# Patient Record
Sex: FEMALE | Race: WHITE | NOT HISPANIC OR LATINO
[De-identification: names, ages, dates, MRNs, and addresses within clinical notes are randomized per-mention and may not be internally consistent; named-entity substitution may affect disease eponyms.]

---

## 2022-09-26 PROBLEM — Z00.00 ENCOUNTER FOR PREVENTIVE HEALTH EXAMINATION: Status: ACTIVE | Noted: 2022-09-26

## 2022-10-18 ENCOUNTER — APPOINTMENT (OUTPATIENT)
Dept: ORTHOPEDIC SURGERY | Facility: CLINIC | Age: 47
End: 2022-10-18

## 2022-10-18 VITALS — WEIGHT: 200 LBS | BODY MASS INDEX: 35.88 KG/M2 | HEIGHT: 62.5 IN

## 2022-10-18 DIAGNOSIS — Z86.2 PERSONAL HISTORY OF DISEASES OF THE BLOOD AND BLOOD-FORMING ORGANS AND CERTAIN DISORDERS INVOLVING THE IMMUNE MECHANISM: ICD-10-CM

## 2022-10-18 DIAGNOSIS — Z78.9 OTHER SPECIFIED HEALTH STATUS: ICD-10-CM

## 2022-10-18 DIAGNOSIS — G25.89 OTHER SPECIFIED EXTRAPYRAMIDAL AND MOVEMENT DISORDERS: ICD-10-CM

## 2022-10-18 DIAGNOSIS — M32.9 SYSTEMIC LUPUS ERYTHEMATOSUS, UNSPECIFIED: ICD-10-CM

## 2022-10-18 DIAGNOSIS — Z87.39 PERSONAL HISTORY OF OTHER DISEASES OF THE MUSCULOSKELETAL SYSTEM AND CONNECTIVE TISSUE: ICD-10-CM

## 2022-10-18 DIAGNOSIS — Z86.79 PERSONAL HISTORY OF OTHER DISEASES OF THE CIRCULATORY SYSTEM: ICD-10-CM

## 2022-10-18 DIAGNOSIS — J45.909 UNSPECIFIED ASTHMA, UNCOMPLICATED: ICD-10-CM

## 2022-10-18 DIAGNOSIS — S46.912A STRAIN OF UNSPECIFIED MUSCLE, FASCIA AND TENDON AT SHOULDER AND UPPER ARM LEVEL, LEFT ARM, INITIAL ENCOUNTER: ICD-10-CM

## 2022-10-18 DIAGNOSIS — Z63.5 DISRUPTION OF FAMILY BY SEPARATION AND DIVORCE: ICD-10-CM

## 2022-10-18 PROCEDURE — 73030 X-RAY EXAM OF SHOULDER: CPT | Mod: LT

## 2022-10-18 PROCEDURE — 99213 OFFICE O/P EST LOW 20 MIN: CPT

## 2022-10-18 RX ORDER — HYDROXYCHLOROQUINE SULFATE 200 MG/1
200 TABLET ORAL
Refills: 0 | Status: ACTIVE | COMMUNITY

## 2022-10-18 RX ORDER — OMEPRAZOLE 20 MG/1
20 CAPSULE, DELAYED RELEASE ORAL
Refills: 0 | Status: ACTIVE | COMMUNITY

## 2022-10-18 RX ORDER — BENRALIZUMAB 30 MG/ML
30 INJECTION, SOLUTION SUBCUTANEOUS
Refills: 0 | Status: ACTIVE | COMMUNITY

## 2022-10-18 RX ORDER — ALBUTEROL SULFATE 2.5 MG/3ML
(2.5 MG/3ML) SOLUTION RESPIRATORY (INHALATION)
Refills: 0 | Status: ACTIVE | COMMUNITY

## 2022-10-18 RX ORDER — MELOXICAM 15 MG/1
TABLET ORAL
Refills: 0 | Status: ACTIVE | COMMUNITY

## 2022-10-18 SDOH — SOCIAL STABILITY - SOCIAL INSECURITY: DISRUPTION OF FAMILY BY SEPARATION AND DIVORCE: Z63.5

## 2022-10-18 NOTE — REASON FOR VISIT
[FreeTextEntry2] : here today for f/u left shoulder pain.  c/o 1 month ago heard a pop in left shoulder.  c/o numbness in hand if she lays wrong in bed. \par using meloxicam and prednisone (breathing prob) which is helping pain. s/p left shouder scope 9/17/20. Patient is taking meloxicam and oral prednisone. She is taking 5-10 mg of prednisone per day

## 2022-10-18 NOTE — DISCUSSION/SUMMARY
[Medication Risks Reviewed] : Medication risks reviewed [de-identified] : 48 y/o female with left shoulder strain. Patient advised to initiate physical therapy for scapula stabilizing muscle massage. Discussed importance of exercise and muscle stretching before and after exercise. Reviewed x-rays. Explained the importance of ice and rest. Continue treatment with Meloxicam and prednisone as prescribed.

## 2022-10-18 NOTE — PHYSICAL EXAM
[5 ___] : forward flexion 5[unfilled]/5 [5___] : internal rotation 5[unfilled]/5 [Left] : left shoulder [] : negative Madsen [FreeTextEntry8] : medial scapula tenderness [de-identified] : with pain [FreeTextEntry1] : Type 1 Acromion; no significant bone abnormalities [TWNoteComboBox7] : active forward flexion 160 degrees [TWNoteComboBox6] : internal rotation L3 [de-identified] : external rotation 80 degrees

## 2023-06-22 ENCOUNTER — EMERGENCY (EMERGENCY)
Facility: HOSPITAL | Age: 48
LOS: 1 days | Discharge: HOME | End: 2023-06-22
Attending: EMERGENCY MEDICINE
Payer: COMMERCIAL

## 2023-06-22 VITALS
HEART RATE: 74 BPM | WEIGHT: 149.91 LBS | TEMPERATURE: 98 F | OXYGEN SATURATION: 99 % | HEIGHT: 64 IN | SYSTOLIC BLOOD PRESSURE: 118 MMHG | DIASTOLIC BLOOD PRESSURE: 77 MMHG | RESPIRATION RATE: 20 BRPM

## 2023-06-22 PROCEDURE — 99284 EMERGENCY DEPT VISIT MOD MDM: CPT

## 2023-06-22 PROCEDURE — 94640 AIRWAY INHALATION TREATMENT: CPT

## 2023-06-22 PROCEDURE — 96372 THER/PROPH/DIAG INJ SC/IM: CPT

## 2023-06-22 PROCEDURE — 99284 EMERGENCY DEPT VISIT MOD MDM: CPT | Mod: 25

## 2023-06-22 RX ORDER — IPRATROPIUM/ALBUTEROL SULFATE 18-103MCG
3 AEROSOL WITH ADAPTER (GRAM) INHALATION ONCE
Refills: 0 | Status: COMPLETED | OUTPATIENT
Start: 2023-06-22 | End: 2023-06-22

## 2023-06-22 RX ORDER — IPRATROPIUM/ALBUTEROL SULFATE 18-103MCG
3 AEROSOL WITH ADAPTER (GRAM) INHALATION
Refills: 0 | Status: DISCONTINUED | OUTPATIENT
Start: 2023-06-22 | End: 2023-06-29

## 2023-06-22 RX ORDER — IPRATROPIUM/ALBUTEROL SULFATE 18-103MCG
2 AEROSOL WITH ADAPTER (GRAM) INHALATION EVERY 6 HOURS
Refills: 0 | Status: DISCONTINUED | OUTPATIENT
Start: 2023-06-22 | End: 2023-06-22

## 2023-06-22 RX ORDER — IBUPROFEN 200 MG
800 TABLET ORAL ONCE
Refills: 0 | Status: DISCONTINUED | OUTPATIENT
Start: 2023-06-22 | End: 2023-06-29

## 2023-06-22 RX ORDER — DEXAMETHASONE 0.5 MG/5ML
9 ELIXIR ORAL ONCE
Refills: 0 | Status: COMPLETED | OUTPATIENT
Start: 2023-06-22 | End: 2023-06-22

## 2023-06-22 RX ADMIN — Medication 3 MILLILITER(S): at 13:00

## 2023-06-22 RX ADMIN — Medication 9 MILLIGRAM(S): at 13:03

## 2023-06-22 RX ADMIN — Medication 3 MILLILITER(S): at 11:02

## 2023-06-22 NOTE — ED PROVIDER NOTE - NS ED ATTENDING STATEMENT MOD
This was a shared visit with the KYLEIGH. I reviewed and verified the documentation and independently performed the documented:

## 2023-06-22 NOTE — ED ADULT NURSE NOTE - OBJECTIVE STATEMENT
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

## 2023-06-22 NOTE — ED PROVIDER NOTE - CLINICAL SUMMARY MEDICAL DECISION MAKING FREE TEXT BOX
48-year-old male female presented to ED complaining of allergic reaction x1 day.  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  Patient admits to wheezing and difficulty breathing at which time she injected herself with an EpiPen.  Patient admits to history of spinal fusion, disc herniation, lupus and aneurysm. HEENT: Normal findings, Eyes : PERRLA, EOMI , Nares clear and Throat : WNL  Lungs: Clear B/L with good air entry  CVS: S1-S2 , with no murmurs  Abd : Normal BS, with no tenderness or organomegaly  Ext: Normal findings  No signs of Allergic reaction . 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

## 2023-06-22 NOTE — ED PROVIDER NOTE - OBJECTIVE STATEMENT
48-year-old male female presented to ED complaining of allergic reaction x1 day.  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  Patient admits to wheezing and difficulty breathing at which time she injected herself with an EpiPen.  Patient admits to history of spinal fusion, disc herniation, lupus and aneurysm.  Patient denies any headache, chest pain, shortness of breath.  Patient admits to allergic asthma after COVID-19 infection.  Patient denies any other issue at this time.

## 2023-06-22 NOTE — ED PROVIDER NOTE - PROGRESS NOTE DETAILS
Patient tolerated DuoNeb treatment x3.  Patient also treated with dexamethasone 10 mg IV.  Patient given Benadryl 50 mg IV.  On reevaluation patient has no wheezing lungs is clear.   Patient came from bathroom and started having difficulty breathing and heart rate went up and stridor from umbilical cord.  Patient symptoms consistent with paradoxical vocal cord spasm.  Patient made aware of her clinical assessment.  Patient stated that she was she was told to follow ENT to give vocal cord training.  Patient states that she will follow-up with ENT as discussed.  Patient DC in stable condition and will follow-up for her ENT Patient placed on bedside monitor heart rate within 78 bpm, blood pressure maintained no arrhythmia noted.  Patient has no chest pain.  Patient monitor for an extended period in ED and DC in stable condition

## 2023-06-22 NOTE — ED PROVIDER NOTE - PATIENT PORTAL LINK FT
You can access the FollowMyHealth Patient Portal offered by [facility] by registering at the following website: [email or web address]. By joining [employer]’s FollowMyHealth portal, you will also be able to view your health information using other applications (apps) compatible with our system.

## 2023-06-22 NOTE — ED PROVIDER NOTE - ATTENDING APP SHARED VISIT CONTRIBUTION OF CARE
I agree with the PA's note and was available for any issues/concerns. I was directly involved in patient care. My brief overall assessment is as follows:     Pt with liekly paradoxical vocal cord spasm, patient had episode of resp distress without wheeae and good air entry, patient with periods of stridor that resolve when she slows her breathing, no stridor on auscultation of neck. no hypoxia. noted tachycardia. patient states she has a rash but none appreciated. no hypotension. no GI sx. unlikely allergy but tx with benadryl/steroids. saw ENT who recommended vocal cord specialist and she did not follow up. no concern for acute airway obstruction at this time.

## 2023-06-22 NOTE — ED ADULT TRIAGE NOTE - CHIEF COMPLAINT QUOTE
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 pt states she has had scabbed hives all week from the same office. pt denies trouble swallowing or breathing, no s/s distress

## 2024-08-09 NOTE — ED ADULT TRIAGE NOTE - HEIGHT IN INCHES
Pt ambulatory in ED with c/o adhesive allergy, pt had surgery on Monday and they put adhesive tape on her incision and its inflamed.   
4

## 2024-12-03 ENCOUNTER — APPOINTMENT (OUTPATIENT)
Dept: ORTHOPEDIC SURGERY | Facility: CLINIC | Age: 49
End: 2024-12-03